# Patient Record
Sex: MALE | Race: WHITE | NOT HISPANIC OR LATINO | Employment: STUDENT | URBAN - METROPOLITAN AREA
[De-identification: names, ages, dates, MRNs, and addresses within clinical notes are randomized per-mention and may not be internally consistent; named-entity substitution may affect disease eponyms.]

---
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
## 2025-05-29 ENCOUNTER — OFFICE VISIT (OUTPATIENT)
Dept: ENDOCRINOLOGY | Facility: CLINIC | Age: 20
End: 2025-05-29
Payer: MEDICAID

## 2025-05-29 VITALS
OXYGEN SATURATION: 99 % | HEIGHT: 75 IN | SYSTOLIC BLOOD PRESSURE: 118 MMHG | BODY MASS INDEX: 24.25 KG/M2 | HEART RATE: 84 BPM | WEIGHT: 195 LBS | DIASTOLIC BLOOD PRESSURE: 70 MMHG

## 2025-05-29 DIAGNOSIS — E10.65 TYPE 1 DIABETES MELLITUS WITH HYPERGLYCEMIA: Primary | ICD-10-CM

## 2025-05-29 PROCEDURE — 1160F RVW MEDS BY RX/DR IN RCRD: CPT | Performed by: INTERNAL MEDICINE

## 2025-05-29 PROCEDURE — 99204 OFFICE O/P NEW MOD 45 MIN: CPT | Performed by: INTERNAL MEDICINE

## 2025-05-29 PROCEDURE — 95251 CONT GLUC MNTR ANALYSIS I&R: CPT | Performed by: INTERNAL MEDICINE

## 2025-05-29 PROCEDURE — 1159F MED LIST DOCD IN RCRD: CPT | Performed by: INTERNAL MEDICINE

## 2025-05-29 RX ORDER — INSULIN LISPRO 100 [IU]/ML
INJECTION, SOLUTION INTRAVENOUS; SUBCUTANEOUS
Qty: 45 ML | Refills: 3 | Status: SHIPPED | OUTPATIENT
Start: 2025-05-29

## 2025-05-29 RX ORDER — INSULIN GLARGINE 100 [IU]/ML
34 INJECTION, SOLUTION SUBCUTANEOUS DAILY
Qty: 30 ML | Refills: 2 | Status: SHIPPED | OUTPATIENT
Start: 2025-05-29

## 2025-05-29 RX ORDER — ESCITALOPRAM OXALATE 10 MG/1
TABLET ORAL EVERY 24 HOURS
COMMUNITY

## 2025-05-29 RX ORDER — PEN NEEDLE, DIABETIC 30 GX3/16"
1 NEEDLE, DISPOSABLE MISCELLANEOUS
Qty: 400 EACH | Refills: 5 | Status: SHIPPED | OUTPATIENT
Start: 2025-05-29

## 2025-05-29 RX ORDER — INSULIN PMP CART,AUT,G6/7,CNTR
1 EACH SUBCUTANEOUS DAILY
Qty: 1 KIT | Refills: 0 | Status: SHIPPED | OUTPATIENT
Start: 2025-05-29

## 2025-05-29 RX ORDER — ACYCLOVIR 400 MG/1
1 TABLET ORAL
Qty: 9 EACH | Refills: 3 | Status: SHIPPED | OUTPATIENT
Start: 2025-05-29

## 2025-05-29 RX ORDER — INSULIN PMP CART,AUT,G6/7,CNTR
1 EACH SUBCUTANEOUS DAILY
Qty: 10 EACH | Refills: 6 | Status: SHIPPED | OUTPATIENT
Start: 2025-05-29

## 2025-05-29 RX ORDER — ACYCLOVIR 400 MG/1
TABLET ORAL
COMMUNITY
Start: 2025-05-27 | End: 2025-05-29

## 2025-05-29 NOTE — PROGRESS NOTES
-----------------------------------------------------------------  ENDOCRINE CLINIC NOTE  -----------------------------------------------------------------        PATIENT NAME: Flavio Blood  PATIENT : 2005 AGE: 20 y.o.  MRN NUMBER: [id number]  PRIMARY CARE: Moustapha Rodriguez,     ==========================================================================    CHIEF COMPLAINT: T1DM  DATE OF SERVICE: 25    ==========================================================================    HPI / SUBJECTIVE    20 y.o. male is seen in the clinic today for Type 1 Diabetes.  He was previously following [facility] endocrinology Dr. Lancaster, reports to have last visit in 2025.    -Diagnosis Date:   -Last known A1c: 10% in 2025    -Current Therapy / Medications:  Basaglar - 34 units  HumaLog - 1:8 units and ICO 1:30 > 250    Previous tried pump during middle school     -Home BG logs / monitoring: Dexcom G7    -Meals / Dietary Habits:  Three meals a day    -Last eye exam: More than a year  -Neuropathy: -ve  -Nephropathy: -ve  -Have glucagon at home    ==========================================================================                                                PAST MEDICAL HISTORY    Past Medical History:   Diagnosis Date    Diabetes mellitus        ==========================================================================    PAST SURGICAL HISTORY    History reviewed. No pertinent surgical history.    ==========================================================================    FAMILY HISTORY    History reviewed. No pertinent family history.    ==========================================================================    SOCIAL HISTORY    Social History     Socioeconomic History    Marital status: Single   Tobacco Use    Smoking status: Never    Smokeless tobacco: Never   Vaping Use    Vaping status: Never Used   Substance and Sexual Activity    Alcohol use: Never    Drug use:  Never    Sexual activity: Defer       ==========================================================================    MEDICATIONS      Current Outpatient Medications:     Insulin Glargine (BASAGLAR KWIKPEN) 100 UNIT/ML injection pen, Inject 34 Units under the skin into the appropriate area as directed Daily., Disp: 30 mL, Rfl: 2    Insulin Lispro, 1 Unit Dial, (HUMALOG) 100 UNIT/ML solution pen-injector, Patient currently on insulin to carb ratio and correction factor no more than 15 units with each meal.  Total daily dose of 45 units., Disp: 45 mL, Rfl: 3    naproxen (NAPROSYN) 500 MG tablet, Take 1 tablet by mouth 2 (Two) Times a Day As Needed for Moderate Pain., Disp: 20 tablet, Rfl: 0    Continuous Glucose Sensor (Dexcom G7 Sensor) misc, Use 1 each 4 (Four) Times a Day Before Meals & at Bedtime., Disp: 9 each, Rfl: 3    escitalopram (LEXAPRO) 10 MG tablet, Daily., Disp: , Rfl:     Insulin Disposable Pump (Omnipod 5 [id number] Intro Gen 5) kit, Use 1 package Daily., Disp: 1 kit, Rfl: 0    Insulin Disposable Pump (Omnipod 5 [id number] Pods Gen 5) misc, Use 1 package Daily., Disp: 10 each, Rfl: 6    ==========================================================================    ALLERGIES    No Known Allergies    ==========================================================================    OBJECTIVE    Vitals:    05/29/25 1506   BP: 118/70   Pulse: 84   SpO2: 99%     Body mass index is 24.37 kg/m².     General: Alert, cooperative, no acute distress  Thyroid:  no enlargement/tenderness/palpable nodules  Lungs: Clear to auscultation bilaterally, respirations unlabored  Heart: Regular rate and rhythm, S1 and S2 normal, no murmur, rub or gallop  Abdomen: Soft, NT, ND and Bowel sounds Positive  Extremities:  Extremities normal, atraumatic, no cyanosis or edema    ==========================================================================    LAB EVALUATION    Lab Results   Component Value Date    BUN 14 02/05/2021    CREATININE 0.6  "02/05/2021    BCR 25 02/05/2021    K 4.5 02/05/2021    CO2 30 02/05/2021    CALCIUM 10 02/05/2021    ALBUMIN 4.6 02/05/2021    LABIL2 1.6 02/05/2021    AST 24 02/05/2021    ALT 15 02/05/2021     No results found for: \"HGBA1C\"  Lab Results   Component Value Date    CREATININE 0.6 02/05/2021     Lab Results   Component Value Date    TSH 1.82 02/05/2021    FREET4 1.01 08/28/2023     ==========================================================================    CGM Data:    Dates: January 1, 2025 till February 28, 2025    Very High > 250: 44%  High 180 - 250: 24%  Target: 70 - 180: 31%  Low 55 - 70:  1%  Very Low < 55: <1%    ==========================================================================    ASSESSMENT AND PLAN    # Type 1 diabetes with hyperglycemia  - Patient have been missing CGM intermittently therefore the data that was more consistent for me to follow was from January to February 2025, reviewed  - Patient will need continued basal bolus insulin therapy  - Will continue insulin Lantus/Basaglar 34 units daily along with Humalog but will change correction to 1: 30 with target of 120  - Additionally discussed with patient about potentially using insulin pump and discussed about OmniPod and islet pump  - After discussion currently plan is to pursue OmniPod while continuing insulin therapy  - Patient have glucagon at home  - Will get C-peptide levels today along with recent A1c along with BMP and microalbumin  - Continue insulin as follows:  Basaglar 34 units daily  Insulin Humalog: Insulin to carb ratio 1:8 and insulin correction factor I: 30 > 120  - If approved for the pump patient settings are going to be as follows:  Basal insulin: 1.2 units/h  Bolus insulin: Insulin to carb ratio 1:10, ISF 1: 40 with target blood sugar of 110  - Continue Dexcom G7  - Clinical follow-up in 3 months    Thank you for courtesy of consultation.    Return to clinic: 3 months    Entire assessment and plan was discussed and " counseled the patient in detail to which patient verbalized understanding and agreed with care.  Answered all queries and concerns.    Part of this note may be an electronic transcription/translation of spoken language to printed text using the Dragon Dictation System.     Note: Portions of this note may have been copied from previous notes but documentation have been reviewed and edited as necessary to support clinical decision making for today's visit.    ==========================================================================    INFORMATION PROVIDED TO PATIENT    Patient Instructions   Please,    - Continue insulin Lantus/Basaglar 34 units daily.  - Continue insulin lispro/Humalo unit for every 8 g of carb and correction for high blood sugar 1 unit for every 30 mg/dL up above 120    - Please always carries some source of sugar with you in case of hypoglycemia/low blood sugar.    - Please always have 1 pen of glucagon at home and 1 at work in case of hypoglycemia.    - Get blood work done today.    - I have also ordered insulin pump called OmniPod 5 for you, if covered by the insurance you need to make an appointment with pump representative for starting the OmniPod.    - Continue check your blood sugar through Dexcom G7.    Follow-up in 3 months time.    Thank you for your visit today.    If you have any questions or concerns please feel free to reach out of the office.       ==========================================================================  Michael Villarreal MD  Department of Endocrine, Diabetes and Metabolism  [place], [place]  ==========================================================================

## 2025-05-29 NOTE — PATIENT INSTRUCTIONS
Please,    - Continue insulin Lantus/Basaglar 34 units daily.  - Continue insulin lispro/Humalo unit for every 8 g of carb and correction for high blood sugar 1 unit for every 30 mg/dL up above 120    - Please always carries some source of sugar with you in case of hypoglycemia/low blood sugar.    - Please always have 1 pen of glucagon at home and 1 at work in case of hypoglycemia.    - Get blood work done today.    - I have also ordered insulin pump called OmniPod 5 for you, if covered by the insurance you need to make an appointment with pump representative for starting the OmniPod.    - Continue check your blood sugar through Dexcom G7.    Follow-up in 3 months time.    Thank you for your visit today.    If you have any questions or concerns please feel free to reach out of the office.

## 2025-05-31 LAB
ALBUMIN SERPL-MCNC: 4.3 G/DL (ref 4.3–5.2)
ALP SERPL-CCNC: 140 IU/L (ref 51–125)
ALT SERPL-CCNC: 22 IU/L (ref 0–44)
AST SERPL-CCNC: 32 IU/L (ref 0–40)
BILIRUB SERPL-MCNC: 0.2 MG/DL (ref 0–1.2)
BUN SERPL-MCNC: 11 MG/DL (ref 6–20)
BUN/CREAT SERPL: 14 (ref 9–20)
C PEPTIDE SERPL-MCNC: <0.1 NG/ML (ref 1.1–4.4)
CALCIUM SERPL-MCNC: 9.5 MG/DL (ref 8.7–10.2)
CHLORIDE SERPL-SCNC: 101 MMOL/L (ref 96–106)
CO2 SERPL-SCNC: 26 MMOL/L (ref 20–29)
CREAT SERPL-MCNC: 0.78 MG/DL (ref 0.76–1.27)
EGFRCR SERPLBLD CKD-EPI 2021: 131 ML/MIN/1.73
GLOBULIN SER CALC-MCNC: 2.5 G/DL (ref 1.5–4.5)
GLUCOSE SERPL-MCNC: 126 MG/DL (ref 70–99)
HBA1C MFR BLD: 8.9 % (ref 4.8–5.6)
POTASSIUM SERPL-SCNC: 4.5 MMOL/L (ref 3.5–5.2)
PROT SERPL-MCNC: 6.8 G/DL (ref 6–8.5)
SODIUM SERPL-SCNC: 141 MMOL/L (ref 134–144)